# Patient Record
(demographics unavailable — no encounter records)

---

## 2017-06-21 DIAGNOSIS — L12.9 PEMPHIGOID (H): ICD-10-CM

## 2017-06-21 RX ORDER — DOXYCYCLINE 100 MG/1
100 CAPSULE ORAL DAILY
Qty: 60 CAPSULE | Refills: 5 | OUTPATIENT
Start: 2017-06-21

## 2017-06-21 NOTE — TELEPHONE ENCOUNTER
As resident on call, received refill request. Chart and attached communication reviewed. Patient last seen 7/2016. Per Dr. Gilmore, plan at that time was to proceed with weaning doxycycline, 100 mg daily and RTC in 6 months. Pt is overdue for follow up and is not currently scheduled. Refill declined.    Kim Cardozo MD  PGY-2, Dermatology  [phone]  Resident on Call

## 2017-06-21 NOTE — TELEPHONE ENCOUNTER
Last seen 7/13/16: No future appointment scheduled  1. Pemphigoid:  Clinically suggestive of BP but negative antigens and DIF.  Stable and  improved on doxycycline 100mg BID and lidex solution to the scalp. No obvious disease activity presently; encouraging.    Discussed disease pathogenesis and the patient's continued improvement in detail with the patient and his wife.      Given resolution of previous scalp blisters, will go forward with weaning doxy.  Will decrease to 100 mg once daily with instructions to increase to BID if things worsen again.     Tolerating doxycyline without GI upset or sun sensitivity.  Will need to be continually cautious with warfarin dosing. Patient has been stable over the last several months and follows with coumadin clinic regularly.  Not likely to have a large impact at this time.    Continue lidex 0.05% solution BID to the scalp as needed. May increase in frquency.       2.  Seborrheic dermatitis, scalp. Continue with head and shoulders ever to every other day. May use Lidex for scalp itching symptoms.      CC Dr. Bryan Fritsch on close of this encounter.     Follow-up in 6 months, earlier for new or changing lesions.

## 2017-09-13 DIAGNOSIS — L13.9 BULLOUS ERUPTION: ICD-10-CM

## 2017-09-13 RX ORDER — FLUOCINONIDE TOPICAL SOLUTION USP, 0.05% 0.5 MG/ML
SOLUTION TOPICAL
Qty: 60 ML | Refills: 0 | Status: SHIPPED | OUTPATIENT
Start: 2017-09-13

## 2017-09-13 NOTE — TELEPHONE ENCOUNTER
Patient chart reviewed. Patient has not been seen in clinic since July 2016. Will refill x 1 and then patient needs to be seen in clinic.     Brad Cardozo MD, PhD  Medicine-Dermatology PGY-2

## 2017-09-13 NOTE — TELEPHONE ENCOUNTER
Last visit: 7/13/16  Next visit: None scheduled  (called patient today to schedule a follow up. Left voicemail.)    Impression/Plan:  1. Pemphigoid:  Clinically suggestive of BP but negative antigens and DIF.  Stable and  improved on doxycycline 100mg BID and lidex solution to the scalp. No obvious disease activity presently; encouraging.    Discussed disease pathogenesis and the patient's continued improvement in detail with the patient and his wife.      Given resolution of previous scalp blisters, will go forward with weaning doxy.  Will decrease to 100 mg once daily with instructions to increase to BID if things worsen again.     Tolerating doxycyline without GI upset or sun sensitivity.  Will need to be continually cautious with warfarin dosing. Patient has been stable over the last several months and follows with coumadin clinic regularly.  Not likely to have a large impact at this time.    Continue lidex 0.05% solution BID to the scalp as needed. May increase in frquency.       2.  Seborrheic dermatitis, scalp. Continue with head and shoulders ever to every other day. May use Lidex for scalp itching symptoms.      CC Dr. Bryan Fritsch on close of this encounter.     Follow-up in 6 months, earlier for new or changing lesions

## 2018-02-05 DIAGNOSIS — L13.9 BULLOUS ERUPTION: ICD-10-CM

## 2018-02-06 RX ORDER — FLUOCINONIDE TOPICAL SOLUTION USP, 0.05% 0.5 MG/ML
SOLUTION TOPICAL
Qty: 60 ML | Refills: 0 | OUTPATIENT
Start: 2018-02-06